# Patient Record
Sex: FEMALE | Race: WHITE | Employment: OTHER | URBAN - NONMETROPOLITAN AREA
[De-identification: names, ages, dates, MRNs, and addresses within clinical notes are randomized per-mention and may not be internally consistent; named-entity substitution may affect disease eponyms.]

---

## 2018-10-16 ENCOUNTER — HOSPITAL ENCOUNTER (EMERGENCY)
Age: 83
Discharge: NURSING, REHAB OR LONG-TERM CARE | End: 2018-10-16
Payer: MEDICARE

## 2018-10-16 ENCOUNTER — APPOINTMENT (OUTPATIENT)
Dept: GENERAL RADIOLOGY | Age: 83
End: 2018-10-16
Payer: MEDICARE

## 2018-10-16 ENCOUNTER — APPOINTMENT (OUTPATIENT)
Dept: CT IMAGING | Age: 83
End: 2018-10-16
Payer: MEDICARE

## 2018-10-16 VITALS
SYSTOLIC BLOOD PRESSURE: 149 MMHG | RESPIRATION RATE: 27 BRPM | TEMPERATURE: 98.4 F | OXYGEN SATURATION: 95 % | DIASTOLIC BLOOD PRESSURE: 76 MMHG | HEART RATE: 79 BPM | BODY MASS INDEX: 42.88 KG/M2 | HEIGHT: 62 IN | WEIGHT: 233 LBS

## 2018-10-16 DIAGNOSIS — S09.90XA CLOSED HEAD INJURY, INITIAL ENCOUNTER: ICD-10-CM

## 2018-10-16 DIAGNOSIS — S46.911A RIGHT SHOULDER STRAIN, INITIAL ENCOUNTER: ICD-10-CM
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
## 2018-10-16 PROBLEM — R06.89 HYPERCAPNIA: Status: ACTIVE | Noted: 2018-10-16

## 2018-10-16 PROBLEM — E87.6 HYPOKALEMIA: Status: ACTIVE | Noted: 2018-10-16

## 2018-10-16 LAB
ALBUMIN SERPL-MCNC: 3.4 G/DL (ref 3.5–5.1)
ALLEN TEST: ABNORMAL
ALP BLD-CCNC: 77 U/L (ref 38–126)
ALT SERPL-CCNC: 14 U/L (ref 11–66)
ANION GAP SERPL CALCULATED.3IONS-SCNC: 10 MEQ/L (ref 8–16)
AST SERPL-CCNC: 19 U/L (ref 5–40)
BASE EXCESS (CALCULATED): 19.5 MMOL/L (ref -2.5–2.5)
BASOPHILS # BLD: 0.2 %
BASOPHILS ABSOLUTE: 0 THOU/MM3 (ref 0–0.1)
BILIRUB SERPL-MCNC: 0.2 MG/DL (ref 0.3–1.2)
BILIRUBIN DIRECT: < 0.2 MG/DL (ref 0–0.3)
BUN BLDV-MCNC: 34 MG/DL (ref 7–22)
CALCIUM SERPL-MCNC: 9.4 MG/DL (ref 8.5–10.5)
CHLORIDE BLD-SCNC: 86 MEQ/L (ref 98–111)
CO2: 46 MEQ/L (ref 23–33)
COLLECTED BY:: ABNORMAL
CREAT SERPL-MCNC: 1 MG/DL (ref 0.4–1.2)
DEVICE: ABNORMAL
EKG ATRIAL RATE: 97 BPM
EKG Q-T INTERVAL: 390 MS
EKG QRS DURATION: 98 MS
EKG QTC CALCULATION (BAZETT): 458 MS
EKG R AXIS: -44 DEGREES
EKG T AXIS: 35 DEGREES
EKG VENTRICULAR RATE: 83 BPM
EOSINOPHIL # BLD: 2.3 %
EOSINOPHILS ABSOLUTE: 0.2 THOU/MM3 (ref 0–0.4)
ERYTHROCYTE [DISTWIDTH] IN BLOOD BY AUTOMATED COUNT: 15.1 % (ref 11.5–14.5)
ERYTHROCYTE [DISTWIDTH] IN BLOOD BY AUTOMATED COUNT: 53.6 FL (ref 35–45)
GFR SERPL CREATININE-BSD FRML MDRD: 52 ML/MIN/1.73M2
GLUCOSE BLD-MCNC: 240 MG/DL (ref 70–108)
HCO3: 48 MMOL/L (ref 23–28)
HCT VFR BLD CALC: 38.6 % (ref 37–47)
HEMOGLOBIN: 11.9 GM/DL (ref 12–16)
IMMATURE GRANS (ABS): 0.01 THOU/MM3 (ref 0–0.07)
IMMATURE GRANULOCYTES: 0.1 %
LYMPHOCYTES # BLD: 29.6 %
LYMPHOCYTES ABSOLUTE: 2.6 THOU/MM3 (ref 1–4.8)
MCH RBC QN AUTO: 29.7 PG (ref 26–33)
MCHC RBC AUTO-ENTMCNC: 30.8 GM/DL (ref 32.2–35.5)
MCV RBC AUTO: 96.3 FL (ref 81–99)
MONOCYTES # BLD: 6.6 %
MONOCYTES ABSOLUTE: 0.6 THOU/MM3 (ref 0.4–1.3)
NUCLEATED RED BLOOD CELLS: 0 /100 WBC
O2 SATURATION: 95 %
OSMOLALITY CALCULATION: 298.6 MOSMOL/KG (ref 275–300)
PCO2: 77 MMHG (ref 35–45)
PH BLOOD GAS: 7.41 (ref 7.35–7.45)
PLATELET # BLD: 210 THOU/MM3 (ref 130–400)
PMV BLD AUTO: 11.1 FL (ref 9.4–12.4)
PO2: 79 MMHG (ref 71–104)
POTASSIUM SERPL-SCNC: 3.1 MEQ/L (ref 3.5–5.2)
PRO-BNP: 606.8 PG/ML (ref 0–1800)
RBC # BLD: 4.01 MILL/MM3 (ref 4.2–5.4)
SEG NEUTROPHILS: 61.2 %
SEGMENTED NEUTROPHILS ABSOLUTE COUNT: 5.4 THOU/MM3 (ref 1.8–7.7)
SODIUM BLD-SCNC: 142 MEQ/L (ref 135–145)
SOURCE, BLOOD GAS: ABNORMAL
TOTAL CK: 70 U/L (ref 30–135)
TOTAL PROTEIN: 7.1 G/DL (ref 6.1–8)
TROPONIN T: < 0.01 NG/ML
WBC # BLD: 8.8 THOU/MM3 (ref 4.8–10.8)

## 2018-10-16 PROCEDURE — 85025 COMPLETE CBC W/AUTO DIFF WBC: CPT

## 2018-10-16 PROCEDURE — 82550 ASSAY OF CK (CPK): CPT

## 2018-10-16 PROCEDURE — 6360000002 HC RX W HCPCS: Performed by: PHYSICIAN ASSISTANT

## 2018-10-16 PROCEDURE — 72125 CT NECK SPINE W/O DYE: CPT

## 2018-10-16 PROCEDURE — 82803 BLOOD GASES ANY COMBINATION: CPT

## 2018-10-16 PROCEDURE — 87086 URINE CULTURE/COLONY COUNT: CPT

## 2018-10-16 PROCEDURE — 76376 3D RENDER W/INTRP POSTPROCES: CPT

## 2018-10-16 PROCEDURE — 6820000001 HC L2 TRAUMA SURGERY EVALUATION

## 2018-10-16 PROCEDURE — 84484 ASSAY OF TROPONIN QUANT: CPT

## 2018-10-16 PROCEDURE — 96375 TX/PRO/DX INJ NEW DRUG ADDON: CPT

## 2018-10-16 PROCEDURE — 94640 AIRWAY INHALATION TREATMENT: CPT

## 2018-10-16 PROCEDURE — 74177 CT ABD & PELVIS W/CONTRAST: CPT

## 2018-10-16 PROCEDURE — APPSS45 APP SPLIT SHARED TIME 31-45 MINUTES: Performed by: PHYSICIAN ASSISTANT

## 2018-10-16 PROCEDURE — 2700000000 HC OXYGEN THERAPY PER DAY

## 2018-10-16 PROCEDURE — 99285 EMERGENCY DEPT VISIT HI MDM: CPT

## 2018-10-16 PROCEDURE — 94760 N-INVAS EAR/PLS OXIMETRY 1: CPT

## 2018-10-16 PROCEDURE — 6370000000 HC RX 637 (ALT 250 FOR IP): Performed by: PHYSICIAN ASSISTANT

## 2018-10-16 PROCEDURE — 70450 CT HEAD/BRAIN W/O DYE: CPT

## 2018-10-16 PROCEDURE — 36600 WITHDRAWAL OF ARTERIAL BLOOD: CPT

## 2018-10-16 PROCEDURE — 70486 CT MAXILLOFACIAL W/O DYE: CPT

## 2018-10-16 PROCEDURE — 73060 X-RAY EXAM OF HUMERUS: CPT

## 2018-10-16 PROCEDURE — 96374 THER/PROPH/DIAG INJ IV PUSH: CPT

## 2018-10-16 PROCEDURE — 80053 COMPREHEN METABOLIC PANEL: CPT

## 2018-10-16 PROCEDURE — 93010 ELECTROCARDIOGRAM REPORT: CPT | Performed by: NUCLEAR MEDICINE

## 2018-10-16 PROCEDURE — 73030 X-RAY EXAM OF SHOULDER: CPT

## 2018-10-16 PROCEDURE — 83880 ASSAY OF NATRIURETIC PEPTIDE: CPT

## 2018-10-16 PROCEDURE — 73120 X-RAY EXAM OF HAND: CPT

## 2018-10-16 PROCEDURE — 71260 CT THORAX DX C+: CPT

## 2018-10-16 PROCEDURE — 6360000004 HC RX CONTRAST MEDICATION: Performed by: PHYSICIAN ASSISTANT

## 2018-10-16 PROCEDURE — 2580000003 HC RX 258: Performed by: PHYSICIAN ASSISTANT

## 2018-10-16 PROCEDURE — 2709999900 HC NON-CHARGEABLE SUPPLY

## 2018-10-16 PROCEDURE — 82248 BILIRUBIN DIRECT: CPT

## 2018-10-16 PROCEDURE — 36415 COLL VENOUS BLD VENIPUNCTURE: CPT

## 2018-10-16 PROCEDURE — 93005 ELECTROCARDIOGRAM TRACING: CPT | Performed by: PHYSICIAN ASSISTANT

## 2018-10-16 RX ORDER — 0.9 % SODIUM CHLORIDE 0.9 %
500 INTRAVENOUS SOLUTION INTRAVENOUS ONCE
Status: COMPLETED | OUTPATIENT
Start: 2018-10-16 | End: 2018-10-16

## 2018-10-16 RX ORDER — IPRATROPIUM BROMIDE AND ALBUTEROL SULFATE 2.5; .5 MG/3ML; MG/3ML
1 SOLUTION RESPIRATORY (INHALATION) ONCE
Status: COMPLETED | OUTPATIENT
Start: 2018-10-16 | End: 2018-10-16

## 2018-10-16 RX ORDER — ACETAMINOPHEN 325 MG/1
650 TABLET ORAL ONCE
Status: DISCONTINUED | OUTPATIENT
Start: 2018-10-16 | End: 2018-10-16 | Stop reason: HOSPADM

## 2018-10-16 RX ORDER — METHYLPREDNISOLONE SODIUM SUCCINATE 125 MG/2ML
125 INJECTION, POWDER, LYOPHILIZED, FOR SOLUTION INTRAMUSCULAR; INTRAVENOUS ONCE
Status: COMPLETED | OUTPATIENT
Start: 2018-10-16 | End: 2018-10-16

## 2018-10-16 RX ORDER — ACETAMINOPHEN 325 MG/1
650 TABLET ORAL EVERY 6 HOURS PRN
Qty: 120 TABLET | Refills: 0 | Status: SHIPPED | OUTPATIENT
Start: 2018-10-16

## 2018-10-16 RX ORDER — LORAZEPAM 2 MG/ML
1 INJECTION INTRAMUSCULAR ONCE
Status: COMPLETED | OUTPATIENT
Start: 2018-10-16 | End: 2018-10-16

## 2018-10-16 RX ADMIN — IOPAMIDOL 80 ML: 755 INJECTION, SOLUTION INTRAVENOUS at 13:31

## 2018-10-16 RX ADMIN — METHYLPREDNISOLONE SODIUM SUCCINATE 125 MG: 125 INJECTION, POWDER, FOR SOLUTION INTRAMUSCULAR; INTRAVENOUS at 14:10

## 2018-10-16 RX ADMIN — IPRATROPIUM BROMIDE AND ALBUTEROL SULFATE 1 AMPULE: .5; 3 SOLUTION RESPIRATORY (INHALATION) at 14:04

## 2018-10-16 RX ADMIN — IPRATROPIUM BROMIDE AND ALBUTEROL SULFATE 1 AMPULE: .5; 3 SOLUTION RESPIRATORY (INHALATION) at 14:08

## 2018-10-16 RX ADMIN — LORAZEPAM 1 MG: 2 INJECTION INTRAMUSCULAR; INTRAVENOUS at 13:24

## 2018-10-16 RX ADMIN — SODIUM CHLORIDE 500 ML: 9 INJECTION, SOLUTION INTRAVENOUS at 13:51

## 2018-10-16 ASSESSMENT — ENCOUNTER SYMPTOMS
SORE THROAT: 0
CONSTIPATION: 0
SHORTNESS OF BREATH: 0
ABDOMINAL DISTENTION: 0
TROUBLE SWALLOWING: 0
VOMITING: 0
RHINORRHEA: 0
RECTAL PAIN: 0
SINUS PRESSURE: 0
EYE REDNESS: 0
VOICE CHANGE: 0
FACIAL SWELLING: 0
CHOKING: 0
NAUSEA: 0
DIARRHEA: 0
BLOOD IN STOOL: 0
EYE DISCHARGE: 0
APNEA: 0
SINUS PAIN: 0
EYE PAIN: 0
STRIDOR: 0
COUGH: 0
ANAL BLEEDING: 0
PHOTOPHOBIA: 0
BACK PAIN: 0
ABDOMINAL PAIN: 0
WHEEZING: 0
COLOR CHANGE: 0
CHEST TIGHTNESS: 0
EYE ITCHING: 0

## 2018-10-16 ASSESSMENT — PAIN DESCRIPTION - ORIENTATION: ORIENTATION: RIGHT

## 2018-10-16 ASSESSMENT — PAIN DESCRIPTION - LOCATION: LOCATION: NECK

## 2018-10-16 ASSESSMENT — PAIN DESCRIPTION - PAIN TYPE: TYPE: ACUTE PAIN

## 2018-10-16 ASSESSMENT — PAIN SCALES - GENERAL: PAINLEVEL_OUTOF10: 4

## 2018-10-16 NOTE — ED NOTES
Bed: 011A  Expected date:   Expected time:   Means of arrival: [facility]  Comments:     Austin Moreno RN  10/16/18 6947

## 2018-10-16 NOTE — ED NOTES
CT called for patient being unable to sit still or lay down for CT scan. Provider notified.      Cr Mccormack RN  10/16/18 [id number]

## 2018-10-16 NOTE — ED NOTES
Audible wheezing exhibited by patient. Respirations 28 to 32 with oxygen saturation 95%. Notified Janessa BAKER.      Vikash Lay RN  10/16/18 [id number]

## 2018-10-16 NOTE — H&P
Trauma H&P     Patient:  Juaquin Frietas  Admit date: 10/16/2018   YOB: 1926 Date of Evaluation: 10/16/2018  MRN: [id number]  Acct: [de-identified]    Injury Date: 10/16/18  Injury time: 0630  PCP: Pratik Orona   Referring physician: Jurgen Gutierrez PA-C    Time of Trauma Surgeon Notification: [phone]  Time of Trauma Surgeon Arrival:  [id number]    Assessment:      Active Problems:    Closed head injury without loss of consciousness    Fall at nursing home    Right shoulder strain, initial encounter    Hypercapnia    Hypokalemia  Resolved Problems:    * No resolved hospital problems.  *    Plan:      No traumatic injuries requiring admission to trauma services   All imaging reviewed and negative  Hypercapnia appears chronic, ABG stable, patient compensating, she should remain on O2 at nursing home   Hypokalemia addressed by ED provider prior to discharge  Patient stable from trauma perspective to return to nursing home, POA admits patient at baseline mentation   Recommend f/u with PCP within next few days  Patient educated to return should they develop worsening signs and symptoms indicative of head injury, including but not limited to nausea, vomiting, worsening headache, visual changes, dizziness, lightheadedness etc.      Activation: []Level I (Trauma Alert) []Level II (Injury Call) [x]Level III (Trauma Consult)  Mode of Arrival: EMS transportation  Referring Facility:   Loss of Consciousness [x]No []Yes[]Unknown     Duration(min)  Mechanism of Injury:  []Motor Vehicle crash   []Single Vehicle [] []Passenger []Scene Fatality []Front Seat  []Restrained   []Air Bag Deployed   []Ejected []Rollover []Pedestrian []Trapped   Type of vehicle:   Protective Devices:   []Motorcycle  Wearing Helmet []Yes []No  []Bicycle  Wearing Helmet []Yes []No  [x]Fall   Distance - Standing   []Assault    Abuse Reported []Yes []No  []Gunshot  []Stabbing  []Work Related  []Burn: []Flame []Scald []Electrical []Chemical []Contact []Inhalation []House Fire  []Other:   Patient Active Problem List   Diagnosis    HLD (hyperlipidemia)    CKD (chronic kidney disease), stage III ([address]    HTN (hypertension)    DJD (degenerative joint disease)    Vitamin D deficiency     Subjective   Chief Complaint: \"My neck hurts. \"    History of Present Illness:  68-year-old female presents as a level III trauma consult to [facility] status post fall. Patient is a resident at [facility], where she states she got up this morning to use the restroom and suffered a mechanical fall. Patient stated that she hit the right side of her head but denied losing consciousness. Patient denies taking any blood thinners, but it appears sheet sent from nursing home notes 325 mg of aspirin daily. Patient is lethargic but easily arouseable and oriented to self, year, and president, but not location. She believes she is still at the nursing home. Original reports from EMS and nursing home stated that patient was \"not acting like herself. \"  Upon my evaluation, patient's POA is in the room and states she believes patient is at baseline mentation. She states that the patient sleeps most of the day but will arouse and answer some questions appropriately, and others inappropriately. Patient has no obvious signs of trauma anywhere and only complains of neck pain. ED provider reports patient complained of right shoulder pain as well but patient demonstrates full range of motion. Patient demonstrates no focal neurological deficits and patient denies any fevers, chills, headache, visual changes, hearing disturbances, oral injuries, chest pain, palpitations, SOB, N/V/D, and any numbness or tingling of bilateral upper and lower extremities at this time. ED provider noted in her examination that patient did complain of some numbness and tingling of her second and third fingers of the left hand. Patient demonstrates good  strength bilaterally.  CT imaging of the software. It may contain minor errors which are inherent in voice recognition technology. **      Final report electronically signed by Dr. Radha Rai on 10/16/2018 2:02 PM      CT Chest W Contrast   Final Result   Chronic findings. No acute abnormality. **This report has been created using voice recognition software. It may contain minor errors which are inherent in voice recognition technology. **      Final report electronically signed by Dr. Radha Rai on 10/16/2018 2:00 PM      CT Head WO Contrast   Final Result    No evidence of an acute process. **This report has been created using voice recognition software. It may contain minor errors which are inherent in voice recognition technology. **      Final report electronically signed by Dr. Mason Carranza on 10/16/2018 1:53 PM      XR SHOULDER RIGHT (MIN 2 VIEWS)   Final Result   No acute process            **This report has been created using voice recognition software. It may contain minor errors which are inherent in voice recognition technology. **      Final report electronically signed by Dr. Radha Rai on 10/16/2018 1:05 PM      XR HUMERUS RIGHT (MIN 2 VIEWS)   Final Result   No acute process            **This report has been created using voice recognition software. It may contain minor errors which are inherent in voice recognition technology. **      Final report electronically signed by Dr. Radha Rai on 10/16/2018 1:05 PM      XR HAND LEFT (2 VIEWS)   Final Result   No acute process            **This report has been created using voice recognition software. It may contain minor errors which are inherent in voice recognition technology. **      Final report electronically signed by Dr. Radha Rai on 10/16/2018 1:02 PM        Fast Exam: No; not warranted    Electronically signed by Margarito Martin PA-C on 10/16/2018 at 4:58 PM   Non witnessed fall  At baseline per POA  All films negative  Hypokalemia  OK to transfer back to

## 2018-10-16 NOTE — ED PROVIDER NOTES
CAPS    Take  by mouth. FUROSEMIDE (LASIX) 40 MG TABLET    Take 40 mg by mouth as needed. OMEGA 3 1000 MG CAPS    Take  by mouth. POTASSIUM CHLORIDE (MICRO-K) 10 MEQ CR CAPSULE    Take 10 mEq by mouth as needed. PROPRANOLOL (INDERAL) 20 MG TABLET    Take 20 mg by mouth 3 times daily. VITAMIN D (CHOLECALCIFEROL) 1000 UNIT TABS TABLET    Take 1,000 Units by mouth daily. ALLERGIES     is allergic to sulfa antibiotics. FAMILY HISTORY     has no family status information on file. [unfilled]    SOCIAL HISTORY      reports that she has never smoked. She does not have any smokeless tobacco history on file. She reports that she does not drink alcohol. PHYSICAL EXAM     INITIAL VITALS:  height is 5' 2\" (1.575 m) and weight is 233 lb (105.7 kg). Her oral temperature is 98.4 °F (36.9 °C). Her blood pressure is 149/76 (abnormal) and her pulse is 79. Her respiration is 27 and oxygen saturation is 95%. Physical Exam   Constitutional: She is oriented to person, place, and time. She appears well-developed and well-nourished. No distress. HENT:   Head: Normocephalic. Head is without raccoon's eyes, without Lyman's sign, without abrasion, without contusion and without laceration. Right Ear: External ear normal.   Left Ear: External ear normal.   Nose: Nose normal. No nose lacerations, sinus tenderness or nasal deformity. No epistaxis. Mouth/Throat: Oropharynx is clear and moist and mucous membranes are normal. No oral lesions. No lacerations. There was no edema, bruising, abrasion, or laceration noted of the face or scalp. There was no tenderness or crepitus of the face or scalp. There were no palpable bony or soft tissue abnormalities of the face or scalp. Eyes: Pupils are equal, round, and reactive to light. Conjunctivae and EOM are normal. Right eye exhibits no discharge. Left eye exhibits no discharge. No scleral icterus. Neck: Normal range of motion. Neck supple.    Cardiovascular: Cervical Spine WO Contrast   Final Result    No fracture. **This report has been created using voice recognition software. It may contain minor errors which are inherent in voice recognition technology. **      Final report electronically signed by Dr. Jameel Lopez on 10/16/2018 2:07 PM      CT ABDOMEN PELVIS W IV CONTRAST Additional Contrast? None   Final Result       1. Right-sided inguinal hernia containing a loop of bowel without evidence of strangulation. 2. Extensive osteopenia and degenerative changes throughout the spine. There are bilateral pars defects at L5-S1 resulting in grade 1 anterolisthesis of L5 over S1.   3. The right kidney is asymmetrically larger than the left. There is a large right pelvic cyst.   4. Colonic diverticulosis without evidence of diverticulitis. **This report has been created using voice recognition software. It may contain minor errors which are inherent in voice recognition technology. **      Final report electronically signed by Dr Gaurav Bull on 10/16/2018 2:06 PM      CT LUMBAR RECONSTRUCTION WO POST PROCESS   Final Result   No acute process            **This report has been created using voice recognition software. It may contain minor errors which are inherent in voice recognition technology. **      Final report electronically signed by Dr. Yajaria Nieves on 10/16/2018 2:05 PM      CT FACIAL BONES WO CONTRAST   Final Result    IMPRESSION: No facial bone fracture seen. Period multifocal degenerative changes, as described. **This report has been created using voice recognition software. It may contain minor errors which are inherent in voice recognition technology. **      Final report electronically signed by Dr. Theresa Jauregui on 10/16/2018 2:05 PM      CT THORACIC RECONSTRUCTION WO POST PROCESS   Final Result   No acute process            **This report has been created using voice recognition software.   It may contain minor

## 2018-10-18 LAB — URINE CULTURE, ROUTINE: NORMAL
